# Patient Record
Sex: MALE
[De-identification: names, ages, dates, MRNs, and addresses within clinical notes are randomized per-mention and may not be internally consistent; named-entity substitution may affect disease eponyms.]

---
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
## 2019-07-28 ENCOUNTER — HOSPITAL ENCOUNTER (EMERGENCY)
Facility: HOSPITAL | Age: 19
Discharge: HOME | End: 2019-07-28
Attending: PEDIATRICS
Payer: COMMERCIAL

## 2019-07-28 VITALS
DIASTOLIC BLOOD PRESSURE: 62 MMHG | HEART RATE: 75 BPM | SYSTOLIC BLOOD PRESSURE: 110 MMHG | RESPIRATION RATE: 16 BRPM | TEMPERATURE: 98 F | WEIGHT: 130 LBS | OXYGEN SATURATION: 98 % | BODY MASS INDEX: 20 KG/M2

## 2019-07-28 DIAGNOSIS — L60.0 INGROWN TOENAIL: Primary | ICD-10-CM

## 2019-07-28 PROCEDURE — 99283 EMERGENCY DEPT VISIT LOW MDM: CPT

## 2019-07-28 RX ORDER — CLINDAMYCIN HYDROCHLORIDE 300 MG/1
300 CAPSULE ORAL 3 TIMES DAILY
Qty: 21 CAPSULE | Refills: 0 | Status: SHIPPED | OUTPATIENT
Start: 2019-07-28 | End: 2019-08-04

## 2019-07-28 NOTE — ED PROVIDER NOTES
Patient Seen in: [facility] Emergency Department    History   Patient presents with:  Nail, Ingrown (integumentary)    Stated Complaint: ingrown toe nail     HPI    58-year-old male to ER for evaluation of left ingrown toenail of left great toe.   He ingrown toenail         Medications Prescribed:  Current Discharge Medication List    START taking these medications    Clindamycin HCl 300 MG Oral Cap  Take 1 capsule (300 mg total) by mouth 3 (three) times daily for 7 days.   Qty: 21 capsule Refills: 0

## 2023-04-10 ENCOUNTER — OCC HEALTH (OUTPATIENT)
Dept: OCCUPATIONAL MEDICINE | Age: 23
End: 2023-04-10
Attending: PHYSICIAN ASSISTANT

## 2023-04-10 DIAGNOSIS — H53.142 VISUAL DISCOMFORT OF LEFT EYE: Primary | ICD-10-CM

## 2023-10-25 ENCOUNTER — HOSPITAL ENCOUNTER (EMERGENCY)
Facility: HOSPITAL | Age: 23
Discharge: HOME | End: 2023-10-25
Attending: EMERGENCY MEDICINE
Payer: COMMERCIAL

## 2023-10-25 VITALS
SYSTOLIC BLOOD PRESSURE: 118 MMHG | BODY MASS INDEX: 19.7 KG/M2 | TEMPERATURE: 98 F | WEIGHT: 130 LBS | HEART RATE: 70 BPM | OXYGEN SATURATION: 100 % | RESPIRATION RATE: 17 BRPM | HEIGHT: 68 IN | DIASTOLIC BLOOD PRESSURE: 63 MMHG

## 2023-10-25 DIAGNOSIS — S01.112A EYEBROW LACERATION, LEFT, INITIAL ENCOUNTER: Primary | ICD-10-CM

## 2023-10-25 PROCEDURE — 12011 RPR F/E/E/N/L/M 2.5 CM/<: CPT

## 2023-10-25 PROCEDURE — 99283 EMERGENCY DEPT VISIT LOW MDM: CPT

## 2024-05-14 NOTE — ED PROVIDER NOTES
Patient Seen in: [facility]    History   Patient presents with:  Clavicle Injury    Stated Complaint: left shoulder pain /wrestling     HPI    Patient had wrestling practice at school today.   At about 1600 he was picked up and s Adduction and limited abduction. Negative Hawkin's Test.  Negative Drop Arm Test.  Positive Lift Off Test.  Negative Mariela Test    ED Course   Labs Reviewed - No data to display  Radiology results reviewed.  No Acute Findings  ED Course as of Nov 28 2040  - [Negative] : Heme/Lymph

## (undated) NOTE — ED AVS SNAPSHOT
Ronaldo Madden   MRN: [id number]    Department:  [facility] Emergency Department   Date of Visit:  7/28/2019           Disclosure     Insurance plans vary and the physician(s) referred by the ER may not be covered by your plan.  Please contact you tell this physician (or your personal doctor if your instructions are to return to your personal doctor) about any new or lasting problems. The primary care or specialist physician will see patients referred from the [facility] Emergency Department.  Kelvin Benítez

## (undated) NOTE — LETTER
[facility] CARE IN [place]  [address] [address]  Dept: [phone]  Dept Fax: [phone]      November 28, 2017    Patient: Josee Hills   Date of Visit: 11/28/2017       To Whom It May Concern:    José Miguel Cai was seen an

## (undated) NOTE — LETTER
[facility] CARE IN [place]  [address] [address]  Dept: [phone]  Dept Fax: [phone]      November 28, 2017    Patient: Fred Belle   Date of Visit: 11/28/2017       To Whom It May Concern:    Claudene Plumber was seen an